# Patient Record
Sex: FEMALE | Race: WHITE | NOT HISPANIC OR LATINO
[De-identification: names, ages, dates, MRNs, and addresses within clinical notes are randomized per-mention and may not be internally consistent; named-entity substitution may affect disease eponyms.]

---

## 2018-01-02 ENCOUNTER — NON-APPOINTMENT (OUTPATIENT)
Age: 58
End: 2018-01-02

## 2018-01-02 ENCOUNTER — APPOINTMENT (OUTPATIENT)
Dept: CARDIOLOGY | Facility: CLINIC | Age: 58
End: 2018-01-02
Payer: COMMERCIAL

## 2018-01-02 VITALS
DIASTOLIC BLOOD PRESSURE: 102 MMHG | SYSTOLIC BLOOD PRESSURE: 177 MMHG | HEIGHT: 61 IN | OXYGEN SATURATION: 94 % | BODY MASS INDEX: 30.58 KG/M2 | HEART RATE: 67 BPM | WEIGHT: 162 LBS

## 2018-01-02 VITALS — DIASTOLIC BLOOD PRESSURE: 90 MMHG | SYSTOLIC BLOOD PRESSURE: 158 MMHG

## 2018-01-02 DIAGNOSIS — Z87.891 PERSONAL HISTORY OF NICOTINE DEPENDENCE: ICD-10-CM

## 2018-01-02 DIAGNOSIS — I21.11 ST ELEVATION (STEMI) MYOCARDIAL INFARCTION INVOLVING RIGHT CORONARY ARTERY: ICD-10-CM

## 2018-01-02 DIAGNOSIS — Z82.49 FAMILY HISTORY OF ISCHEMIC HEART DISEASE AND OTHER DISEASES OF THE CIRCULATORY SYSTEM: ICD-10-CM

## 2018-01-02 PROCEDURE — 99204 OFFICE O/P NEW MOD 45 MIN: CPT

## 2018-01-02 PROCEDURE — 93000 ELECTROCARDIOGRAM COMPLETE: CPT

## 2018-01-02 RX ORDER — ASPIRIN 81 MG
81 TABLET, DELAYED RELEASE (ENTERIC COATED) ORAL DAILY
Qty: 90 | Refills: 3 | Status: ACTIVE | COMMUNITY

## 2018-01-30 ENCOUNTER — APPOINTMENT (OUTPATIENT)
Dept: CARDIOLOGY | Facility: CLINIC | Age: 58
End: 2018-01-30
Payer: COMMERCIAL

## 2018-01-30 VITALS
SYSTOLIC BLOOD PRESSURE: 183 MMHG | OXYGEN SATURATION: 98 % | WEIGHT: 161 LBS | BODY MASS INDEX: 30.4 KG/M2 | HEIGHT: 61 IN | DIASTOLIC BLOOD PRESSURE: 98 MMHG | HEART RATE: 65 BPM

## 2018-01-30 VITALS — DIASTOLIC BLOOD PRESSURE: 88 MMHG | SYSTOLIC BLOOD PRESSURE: 150 MMHG

## 2018-01-30 PROCEDURE — 99215 OFFICE O/P EST HI 40 MIN: CPT

## 2018-01-31 ENCOUNTER — OTHER (OUTPATIENT)
Age: 58
End: 2018-01-31

## 2018-01-31 LAB
ALBUMIN SERPL ELPH-MCNC: 4 G/DL
ALP BLD-CCNC: 172 U/L
ALT SERPL-CCNC: 60 U/L
ANION GAP SERPL CALC-SCNC: 13 MMOL/L
AST SERPL-CCNC: 42 U/L
BASOPHILS # BLD AUTO: 0.04 K/UL
BASOPHILS NFR BLD AUTO: 0.5 %
BILIRUB SERPL-MCNC: 0.7 MG/DL
BUN SERPL-MCNC: 13 MG/DL
CALCIUM SERPL-MCNC: 10 MG/DL
CHLORIDE SERPL-SCNC: 106 MMOL/L
CHOLEST SERPL-MCNC: 170 MG/DL
CHOLEST/HDLC SERPL: 3.5 RATIO
CO2 SERPL-SCNC: 24 MMOL/L
CREAT SERPL-MCNC: 0.91 MG/DL
EOSINOPHIL # BLD AUTO: 0.33 K/UL
EOSINOPHIL NFR BLD AUTO: 4.4 %
GLUCOSE SERPL-MCNC: 96 MG/DL
HCT VFR BLD CALC: 45.4 %
HDLC SERPL-MCNC: 48 MG/DL
HGB BLD-MCNC: 14.2 G/DL
IMM GRANULOCYTES NFR BLD AUTO: 0.1 %
LDLC SERPL CALC-MCNC: 95 MG/DL
LYMPHOCYTES # BLD AUTO: 2.28 K/UL
LYMPHOCYTES NFR BLD AUTO: 30.4 %
MAN DIFF?: NORMAL
MCHC RBC-ENTMCNC: 28.2 PG
MCHC RBC-ENTMCNC: 31.3 GM/DL
MCV RBC AUTO: 90.3 FL
MONOCYTES # BLD AUTO: 0.35 K/UL
MONOCYTES NFR BLD AUTO: 4.7 %
NEUTROPHILS # BLD AUTO: 4.48 K/UL
NEUTROPHILS NFR BLD AUTO: 59.9 %
PLATELET # BLD AUTO: 338 K/UL
POTASSIUM SERPL-SCNC: 5.3 MMOL/L
PROT SERPL-MCNC: 7.2 G/DL
RBC # BLD: 5.03 M/UL
RBC # FLD: 15.3 %
SODIUM SERPL-SCNC: 143 MMOL/L
TRIGL SERPL-MCNC: 136 MG/DL
WBC # FLD AUTO: 7.49 K/UL

## 2018-01-31 RX ORDER — ATORVASTATIN CALCIUM 80 MG/1
80 TABLET, FILM COATED ORAL
Qty: 90 | Refills: 3 | Status: DISCONTINUED | COMMUNITY
End: 2018-01-31

## 2018-02-26 ENCOUNTER — APPOINTMENT (OUTPATIENT)
Dept: CARDIOLOGY | Facility: CLINIC | Age: 58
End: 2018-02-26
Payer: COMMERCIAL

## 2018-02-26 ENCOUNTER — NON-APPOINTMENT (OUTPATIENT)
Age: 58
End: 2018-02-26

## 2018-02-26 VITALS
BODY MASS INDEX: 29.45 KG/M2 | WEIGHT: 156 LBS | HEIGHT: 61 IN | OXYGEN SATURATION: 93 % | HEART RATE: 71 BPM | SYSTOLIC BLOOD PRESSURE: 154 MMHG | DIASTOLIC BLOOD PRESSURE: 95 MMHG

## 2018-02-26 VITALS — SYSTOLIC BLOOD PRESSURE: 130 MMHG | DIASTOLIC BLOOD PRESSURE: 80 MMHG

## 2018-02-26 PROCEDURE — 99215 OFFICE O/P EST HI 40 MIN: CPT

## 2018-02-26 PROCEDURE — 93000 ELECTROCARDIOGRAM COMPLETE: CPT

## 2018-02-26 RX ORDER — LISINOPRIL 20 MG/1
20 TABLET ORAL
Qty: 90 | Refills: 3 | Status: DISCONTINUED | COMMUNITY
End: 2018-02-26

## 2018-03-26 ENCOUNTER — APPOINTMENT (OUTPATIENT)
Dept: CARDIOLOGY | Facility: CLINIC | Age: 58
End: 2018-03-26
Payer: COMMERCIAL

## 2018-03-26 VITALS
BODY MASS INDEX: 29.27 KG/M2 | HEIGHT: 61 IN | HEART RATE: 68 BPM | SYSTOLIC BLOOD PRESSURE: 177 MMHG | OXYGEN SATURATION: 98 % | DIASTOLIC BLOOD PRESSURE: 101 MMHG | WEIGHT: 155 LBS

## 2018-03-26 VITALS — DIASTOLIC BLOOD PRESSURE: 94 MMHG | SYSTOLIC BLOOD PRESSURE: 158 MMHG

## 2018-03-26 LAB
ALBUMIN SERPL ELPH-MCNC: 4.2 G/DL
ALP BLD-CCNC: 87 U/L
ALT SERPL-CCNC: 74 U/L
ANION GAP SERPL CALC-SCNC: 13 MMOL/L
AST SERPL-CCNC: 45 U/L
BILIRUB SERPL-MCNC: 0.7 MG/DL
BUN SERPL-MCNC: 19 MG/DL
CALCIUM SERPL-MCNC: 9.6 MG/DL
CHLORIDE SERPL-SCNC: 105 MMOL/L
CHOLEST SERPL-MCNC: 142 MG/DL
CHOLEST/HDLC SERPL: 2.6 RATIO
CO2 SERPL-SCNC: 23 MMOL/L
CREAT SERPL-MCNC: 1.22 MG/DL
GLUCOSE SERPL-MCNC: 95 MG/DL
HDLC SERPL-MCNC: 54 MG/DL
LDLC SERPL CALC-MCNC: 65 MG/DL
POTASSIUM SERPL-SCNC: 4.5 MMOL/L
PROT SERPL-MCNC: 7.1 G/DL
SODIUM SERPL-SCNC: 141 MMOL/L
TRIGL SERPL-MCNC: 116 MG/DL

## 2018-03-26 PROCEDURE — 99215 OFFICE O/P EST HI 40 MIN: CPT

## 2018-03-26 RX ORDER — ROSUVASTATIN CALCIUM 40 MG/1
40 TABLET, FILM COATED ORAL DAILY
Qty: 90 | Refills: 3 | Status: DISCONTINUED | COMMUNITY
Start: 2018-01-31 | End: 2018-03-26

## 2018-03-26 RX ORDER — VALSARTAN 80 MG/1
80 TABLET, COATED ORAL
Qty: 90 | Refills: 3 | Status: DISCONTINUED | COMMUNITY
Start: 2018-02-26 | End: 2018-03-26

## 2018-04-02 ENCOUNTER — TRANSCRIPTION ENCOUNTER (OUTPATIENT)
Age: 58
End: 2018-04-02

## 2018-05-07 ENCOUNTER — APPOINTMENT (OUTPATIENT)
Dept: CARDIOLOGY | Facility: CLINIC | Age: 58
End: 2018-05-07
Payer: COMMERCIAL

## 2018-05-07 VITALS — SYSTOLIC BLOOD PRESSURE: 110 MMHG | DIASTOLIC BLOOD PRESSURE: 76 MMHG

## 2018-05-07 VITALS
SYSTOLIC BLOOD PRESSURE: 136 MMHG | HEART RATE: 65 BPM | DIASTOLIC BLOOD PRESSURE: 84 MMHG | HEIGHT: 61 IN | BODY MASS INDEX: 28.7 KG/M2 | WEIGHT: 152 LBS

## 2018-05-07 PROCEDURE — 99215 OFFICE O/P EST HI 40 MIN: CPT

## 2018-05-08 LAB
ALBUMIN SERPL ELPH-MCNC: 4.1 G/DL
ALP BLD-CCNC: 103 U/L
ALT SERPL-CCNC: 41 U/L
ANION GAP SERPL CALC-SCNC: 11 MMOL/L
AST SERPL-CCNC: 36 U/L
BILIRUB SERPL-MCNC: 0.7 MG/DL
BUN SERPL-MCNC: 18 MG/DL
CALCIUM SERPL-MCNC: 9.6 MG/DL
CHLORIDE SERPL-SCNC: 110 MMOL/L
CO2 SERPL-SCNC: 21 MMOL/L
CREAT SERPL-MCNC: 1.32 MG/DL
GLUCOSE SERPL-MCNC: 103 MG/DL
POTASSIUM SERPL-SCNC: 4.4 MMOL/L
PROT SERPL-MCNC: 7.4 G/DL
SODIUM SERPL-SCNC: 142 MMOL/L

## 2018-06-05 ENCOUNTER — APPOINTMENT (OUTPATIENT)
Dept: CARDIOLOGY | Facility: CLINIC | Age: 58
End: 2018-06-05
Payer: COMMERCIAL

## 2018-06-05 PROCEDURE — 93306 TTE W/DOPPLER COMPLETE: CPT

## 2018-07-02 ENCOUNTER — NON-APPOINTMENT (OUTPATIENT)
Age: 58
End: 2018-07-02

## 2018-07-02 ENCOUNTER — APPOINTMENT (OUTPATIENT)
Dept: CARDIOLOGY | Facility: CLINIC | Age: 58
End: 2018-07-02
Payer: COMMERCIAL

## 2018-07-02 VITALS
DIASTOLIC BLOOD PRESSURE: 79 MMHG | SYSTOLIC BLOOD PRESSURE: 125 MMHG | BODY MASS INDEX: 28.13 KG/M2 | HEART RATE: 63 BPM | WEIGHT: 149 LBS | OXYGEN SATURATION: 98 % | HEIGHT: 61 IN

## 2018-07-02 VITALS — SYSTOLIC BLOOD PRESSURE: 110 MMHG | DIASTOLIC BLOOD PRESSURE: 70 MMHG

## 2018-07-02 DIAGNOSIS — R74.8 ABNORMAL LEVELS OF OTHER SERUM ENZYMES: ICD-10-CM

## 2018-07-02 PROCEDURE — 93000 ELECTROCARDIOGRAM COMPLETE: CPT

## 2018-07-02 PROCEDURE — 99215 OFFICE O/P EST HI 40 MIN: CPT

## 2018-07-02 RX ORDER — METOPROLOL TARTRATE 25 MG/1
25 TABLET, FILM COATED ORAL TWICE DAILY
Qty: 180 | Refills: 3 | Status: DISCONTINUED | COMMUNITY
End: 2018-07-02

## 2018-09-18 ENCOUNTER — MEDICATION RENEWAL (OUTPATIENT)
Age: 58
End: 2018-09-18

## 2018-09-18 RX ORDER — OLMESARTAN MEDOXOMIL 5 MG/1
5 TABLET, FILM COATED ORAL DAILY
Qty: 90 | Refills: 3 | Status: DISCONTINUED | COMMUNITY
Start: 2018-03-26 | End: 2018-09-18

## 2018-09-24 ENCOUNTER — APPOINTMENT (OUTPATIENT)
Dept: CARDIOLOGY | Facility: CLINIC | Age: 58
End: 2018-09-24
Payer: COMMERCIAL

## 2018-09-24 ENCOUNTER — NON-APPOINTMENT (OUTPATIENT)
Age: 58
End: 2018-09-24

## 2018-09-24 VITALS
DIASTOLIC BLOOD PRESSURE: 85 MMHG | HEIGHT: 61 IN | SYSTOLIC BLOOD PRESSURE: 142 MMHG | WEIGHT: 150 LBS | HEART RATE: 55 BPM | BODY MASS INDEX: 28.32 KG/M2 | OXYGEN SATURATION: 99 %

## 2018-09-24 VITALS — DIASTOLIC BLOOD PRESSURE: 66 MMHG | SYSTOLIC BLOOD PRESSURE: 128 MMHG

## 2018-09-24 PROCEDURE — 93000 ELECTROCARDIOGRAM COMPLETE: CPT

## 2018-09-24 PROCEDURE — 99215 OFFICE O/P EST HI 40 MIN: CPT

## 2018-12-18 ENCOUNTER — NON-APPOINTMENT (OUTPATIENT)
Age: 58
End: 2018-12-18

## 2018-12-18 ENCOUNTER — APPOINTMENT (OUTPATIENT)
Dept: CARDIOLOGY | Facility: CLINIC | Age: 58
End: 2018-12-18
Payer: COMMERCIAL

## 2018-12-18 VITALS
BODY MASS INDEX: 28.7 KG/M2 | SYSTOLIC BLOOD PRESSURE: 160 MMHG | DIASTOLIC BLOOD PRESSURE: 88 MMHG | OXYGEN SATURATION: 100 % | HEIGHT: 61 IN | HEART RATE: 62 BPM | WEIGHT: 152 LBS

## 2018-12-18 VITALS — SYSTOLIC BLOOD PRESSURE: 128 MMHG | DIASTOLIC BLOOD PRESSURE: 80 MMHG

## 2018-12-18 PROCEDURE — 99215 OFFICE O/P EST HI 40 MIN: CPT

## 2018-12-18 PROCEDURE — 93000 ELECTROCARDIOGRAM COMPLETE: CPT

## 2018-12-18 NOTE — PHYSICAL EXAM
[General Appearance - In No Acute Distress] : no acute distress [Normal Conjunctiva] : the conjunctiva exhibited no abnormalities [No Oral Pallor] : no oral pallor [Respiration, Rhythm And Depth] : normal respiratory rhythm and effort [Auscultation Breath Sounds / Voice Sounds] : lungs were clear to auscultation bilaterally [Bowel Sounds] : normal bowel sounds [Abdomen Tenderness] : non-tender [Abnormal Walk] : normal gait [Cyanosis, Localized] : no localized cyanosis [] : no rash [Oriented To Time, Place, And Person] : oriented to person, place, and time [Not Palpable] : not palpable [No Precordial Heave] : no precordial heave was noted [Bradycardia] : bradycardic [Rhythm Regular] : regular [Normal S1] : normal S1 [Normal S2] : normal S2 [No Gallop] : no gallop heard [No Murmur] : no murmurs heard [2+] : left 2+ [No Abnormalities] : the abdominal aorta was not enlarged and no bruit was heard [No Pitting Edema] : no pitting edema present [FreeTextEntry1] : no JVD is appreciated at a 45° angle [Apical Thrill] : no thrill palpable at the apex [Click] : no click [Pericardial Rub] : no pericardial rub [Right Carotid Bruit] : no bruit heard over the right carotid [Left Carotid Bruit] : no bruit heard over the left carotid

## 2018-12-18 NOTE — HISTORY OF PRESENT ILLNESS
[FreeTextEntry1] : Mrs. Pastora Brock presented to the office today for follow-up cardiac evaluation.\par \par The patient is a 58-year-old female with a history of coronary artery disease (status post ACS 10/12/17, PCI to RCA 10/12/17, PCI to Cx. 10/13/17), hypertension, and a dyslipidemia. I evaluated the patient in initial cardiology consultation on 1/2/18, and I last evaluated the patient in the office on 9/24/18.\par \par The patient has been stable from a cardiac symptomatic standpoint since her previous visit here on 9/24/18. Specifically, she has not experienced recurrence of the left neck sensation or the bilateral upper extremity numbness sensation that she experienced in association with her acute coronary syndrome on 10/12/17. She has not experienced any symptoms of chest discomfort or dyspnea on exertion. She has not noted orthopnea, paroxysmal nocturnal dyspnea, or lower extremity edema. She has not experienced any episodes of palpitations, presyncope or syncope.\par \par Review of systems is significant for the patient having injured her right knee secondary to falling while in [place] in October of 2018. Upon returning home, she had an MRI scan of the knee performed, which she states revealed "a chip fracture", which was managed with a steroid injection, rest, and ambulation with a cane.\par \par Laboratory studies performed on 3/26/18 revealed cholesterol 142, triglycerides 116, HDL 54, and calculated LDL 65. The AST was mildly elevated at 45 and the ALT was elevated at 74. The total bilirubin and alkaline phosphatase levels were normal.  A  follow-up blood test performed on 5/7/18 revealed the liver chemistries to be normal.\par \par Echocardiography performed on 6/5/18 revealed normal cardiac chamber sizes with normal left ventricular wall thickness and wall motion. Left ventricular systolic function was normal, with a calculated ejection fraction of 65%. There was evidence for impaired diastolic relaxation of the left ventricle. Mild mitral regurgitation was demonstrated. Mild to moderate pulmonic regurgitation and mild tricuspid regurgitation were demonstrated, with an estimated right ventricular systolic pressure of 35 mm mercury.\par \par Previous History:\par \par The patient did not have a documented cardiac history until 10/12/17. She awakened from sleep early that morning not feeling well, having been experiencing a vaguely-described sensation in the left neck region and bilateral upper extremity "numbness". She was evaluated in the emergency room at [facility] shortly after the onset of the symptoms, and was noted to be exhibiting electrocardiographic evidence of an acute inferior wall myocardial infarction. She was brought urgently to the cardiac catheterization laboratory. Left ventriculography revealed posterobasilar hypokinesis with an estimated ejection fraction of 55%. The left main coronary artery was noted to have minor luminal irregularities, without any obstructive lesions.  A 20% stenosis is noted involving the proximal portion of the left anterior descending artery, with minor luminal irregularities distally.  A 99% stenosis was noted involving the proximal portion of the circumflex artery as well as a 99% stenosis involving the mid-portion of this vessel, a 20% stenosis involving the first obtuse marginal branch of the vessel, and a 70% stenosis involving a small posterolateral branch. The right coronary artery was occluded in the proximal portion, with the lesion being irregularly contoured, hazy, and associated with a moderate filling defect. This lesion was felt to be the culprit lesion for the acute myocardial infarction, and a successful intervention with a drug-eluting stent was performed at the time of the angiogram. The patient was brought back to the cardiac catheterization laboratory on 10/13/17, at which time interventions with drug-eluting stents were performed on the two 99% lesions involving the proximal and mid-portions of the circumflex artery. The patient had an uncomplicated hospital course following these procedures. She was treated with a beta-blocker, an ACE inhibitor, a statin, aspirin, and Brilinta.\par \par As far as risk factors for coronary artery disease are concerned, the patient has a history of hypertension and a dyslipidemia. She does not have a history of diabetes. She discontinued cigarette smoking approximately 35 years ago, having smoked an average of 3-4 cigarettes per day for a period of 5 years prior to that time. She describes having a family history of premature coronary artery disease in her brother, stating that he underwent a coronary stenting procedure while in his 50's.\par \par Past medical/surgical history according to the patient is otherwise significant for arthroscopic left shoulder rotator cuff repair on 11/16/10, arthroscopic right shoulder rotator cuff repair (secondary to an accident) in 2012, surgery for treatment of a left foot tailor's bunion (including a screw in the fifth toe), surgery for treatment of a tailor's bunion involving the right foot, and a laparoscopic appendectomy for treatment of acute appendicitis. She has had 5 pregnancies, 2 of which spontaneously terminated, and the other 3 of which were full-term and uncomplicated, delivered vaginally.

## 2018-12-18 NOTE — DISCUSSION/SUMMARY
[FreeTextEntry1] : Mrs. Brock is status post an acute inferior wall myocardial infarction on 10/12/17, treated promptly with a successful primary PCI (MIRNA) involving an occlusion with thrombus involving the proximal portion of the right coronary artery. The following day, she underwent successful PCI (MIRNA) of two tight stenoses involving the proximal and mid-portions of the circumflex artery. Left ventriculography at the time of the initial procedure on 1/12/17 revealed posterobasilar hypokinesis with overall preserved left ventricular systolic function (estimated ejection fraction of 55%).  A follow-up echocardiogram performed in our office on 6/5/18 revealed normal left ventricular wall motion and systolic function, with an estimated ejection fraction of 65%. She has been doing well from a cardiac symptomatic standpoint since her previous visit here on 9/24/18 (and since undergoing the coronary stenting procedures). Specifically, she does not describe having experienced any signs or symptoms to suggest the presence of an anginal syndrome, congestive heart failure, or a hemodynamically-compromising arrhythmia. Her cardiac examination today is unremarkable. Her blood pressure reading was elevated upon initial presentation to the office today, however, a follow-up measurement obtained after her examination revealed the reading to be normal pregnancies she reports her home blood pressure readings as having been running in the normal range since her previous visit here as well). Her electrocardiogram  today reveals sinus rhythm at a rate of 64 beats per minute with non-specific diminished voltage in leads V4-V6, essentially unchanged from her previous office tracing, allowing for lead placement variation.\par \par As her blood pressure reading today is normal, I have instructed the patient to continue Losartan at a dosage of 25 mg daily and Toprol-XL at a dosage of 50 mg daily for the time being.\par \par The patient had follow-up blood testing performed in our office this morning, including a fasting lipid profile and chemistry screen. I will telephone her to discuss the findings, once they are available. I have instructed her to continue Crestor at a dosage of 40 mg daily for the time being.\par \par I have discussed the findings of the echocardiogram of 6/5/18 in detail with the patient today.  A follow-up study will be planned for June of 2019 for reassessment.\par \par I have again discussed the implications of the acute inferior wall  myocardial infarction and the findings at the coronary angiogram in detail with the patient and her  today. For the time being, I have recommended to her that she continue on the present dosages aspirin and Brilinta.\par \par The importance of dietary modification, weight loss, and regular exercise as tolerated was again discussed with the patient today. I have outlined suggestions for her activity level at this point, as well as restrictions.\par \par I have asked the patient to call me if you have any questions or problems pertaining to these matters, and especially if she should experience any concerning symptoms. I have otherwise asked her to return to the office for follow-up cardiac evaluation and blood pressure reassessment in 6 months, provided she remains clinically stable in the interim. Nuclear stress testing will be planned for October of 2019 to assess the status of the patient's ischemic heart disease, provided she remains clinically stable in the interim.

## 2018-12-19 LAB
ALBUMIN SERPL ELPH-MCNC: 4.1 G/DL
ALP BLD-CCNC: 122 U/L
ALT SERPL-CCNC: 184 U/L
ANION GAP SERPL CALC-SCNC: 11 MMOL/L
AST SERPL-CCNC: 141 U/L
BILIRUB SERPL-MCNC: 0.7 MG/DL
BUN SERPL-MCNC: 23 MG/DL
CALCIUM SERPL-MCNC: 9.1 MG/DL
CHLORIDE SERPL-SCNC: 108 MMOL/L
CHOLEST SERPL-MCNC: 152 MG/DL
CHOLEST/HDLC SERPL: 2.4 RATIO
CO2 SERPL-SCNC: 22 MMOL/L
CREAT SERPL-MCNC: 1.14 MG/DL
GLUCOSE SERPL-MCNC: 98 MG/DL
HDLC SERPL-MCNC: 64 MG/DL
LDLC SERPL CALC-MCNC: 64 MG/DL
POTASSIUM SERPL-SCNC: 4.1 MMOL/L
PROT SERPL-MCNC: 6.7 G/DL
SODIUM SERPL-SCNC: 141 MMOL/L
TRIGL SERPL-MCNC: 121 MG/DL

## 2019-02-27 ENCOUNTER — TRANSCRIPTION ENCOUNTER (OUTPATIENT)
Age: 59
End: 2019-02-27

## 2019-03-18 ENCOUNTER — APPOINTMENT (OUTPATIENT)
Dept: CARDIOLOGY | Facility: CLINIC | Age: 59
End: 2019-03-18
Payer: COMMERCIAL

## 2019-03-18 ENCOUNTER — NON-APPOINTMENT (OUTPATIENT)
Age: 59
End: 2019-03-18

## 2019-03-18 VITALS
WEIGHT: 152 LBS | BODY MASS INDEX: 28.7 KG/M2 | SYSTOLIC BLOOD PRESSURE: 171 MMHG | HEART RATE: 68 BPM | OXYGEN SATURATION: 97 % | HEIGHT: 61 IN | DIASTOLIC BLOOD PRESSURE: 109 MMHG

## 2019-03-18 VITALS — SYSTOLIC BLOOD PRESSURE: 148 MMHG | DIASTOLIC BLOOD PRESSURE: 90 MMHG

## 2019-03-18 PROCEDURE — 99215 OFFICE O/P EST HI 40 MIN: CPT

## 2019-03-18 PROCEDURE — 93000 ELECTROCARDIOGRAM COMPLETE: CPT

## 2019-03-18 NOTE — DISCUSSION/SUMMARY
[FreeTextEntry1] : Mrs. Brock is status post an acute inferior wall myocardial infarction on 10/12/17, treated promptly with a successful primary PCI (MIRNA) involving an occlusion with thrombus involving the proximal portion of the right coronary artery. The following day, she underwent successful PCI (MIRNA) of two tight stenoses involving the proximal and mid-portions of the circumflex artery. Left ventriculography at the time of the initial procedure on 1/12/17 revealed posterobasilar hypokinesis with overall preserved left ventricular systolic function (estimated ejection fraction of 55%).  A follow-up echocardiogram performed in our office on 6/5/18 revealed normal left ventricular wall motion and systolic function, with an estimated ejection fraction of 65%. She has been doing well from a cardiac symptomatic standpoint since her previous visit here on 12/18/18 (and since undergoing the coronary stenting procedures). Specifically, she does not describe having experienced any signs or symptoms to suggest the presence of an anginal syndrome, congestive heart failure, or a hemodynamically-compromising arrhythmia. Her cardiac examination today is unremarkable. Her blood pressure reading is elevated today. Her electrocardiogram  today reveals sinus rhythm at a rate of 64 beats per minute with a non-specific RSR' pattern in lead V2 and non-specific diminished voltage in leads V4-V6, essentially unchanged from her previous office tracing, allowing for lead placement variation.\par \par As her blood pressure reading today is elevated, as well as her home blood pressure readings having been mildly elevated since her previous visit here, I have instructed the patient to increase the dosage of losartan to 25 mg twice daily. In addition, as per the patient's preference, the dosage of Toprol-XL will be changed from 50 mg once daily to 25 mg twice daily. I have electronically prescribed both of these medications to the patient's pharmacy for her today, and I have asked her to call me if she has any difficulty tolerating these changes in her medication regimen.\par \par I have reviewed the findings of the blood test report of 12/18/18 in detail with the patient today, pointing out to her that her liver chemistries were elevated. Will follow-up blood testing will be performed in the office this morning, and if the patient's liver chemistries are still elevated, she will be instructed to reduce the dosage of Crestor from 40 mg daily to 20 mg daily, with repeat blood testing to be performed in one month for reassessment.\par \par I have discussed the findings of the echocardiogram of 6/5/18 in detail with the patient today.  A follow-up study will be planned for June of 2019 for reassessment.\par \par I have again discussed the implications of the acute inferior wall  myocardial infarction and the findings at the coronary angiogram in detail with the patient and her  today. For the time being, I have recommended to her that she continue on the present dosages aspirin and Brilinta.\par \par The importance of dietary modification, weight loss, and regular exercise as tolerated was again discussed with the patient today.\par \par I have asked the patient to call me if you have any questions or problems pertaining to these matters, and especially if she should experience any concerning symptoms. I have otherwise asked her to return to the office for follow-up cardiac evaluation and blood pressure reassessment in one month, provided she remains clinically stable in the interim. Nuclear stress testing will be planned for October of 2019 to assess the status of the patient's ischemic heart disease, provided she remains clinically stable in the interim.

## 2019-03-18 NOTE — PHYSICAL EXAM
[General Appearance - In No Acute Distress] : no acute distress [Normal Conjunctiva] : the conjunctiva exhibited no abnormalities [No Oral Pallor] : no oral pallor [Respiration, Rhythm And Depth] : normal respiratory rhythm and effort [Auscultation Breath Sounds / Voice Sounds] : lungs were clear to auscultation bilaterally [Bowel Sounds] : normal bowel sounds [Abdomen Tenderness] : non-tender [Abnormal Walk] : normal gait [Cyanosis, Localized] : no localized cyanosis [] : no rash [Oriented To Time, Place, And Person] : oriented to person, place, and time [No Precordial Heave] : no precordial heave was noted [Not Palpable] : not palpable [Normal Rate] : normal [Normal S1] : normal S1 [Rhythm Regular] : regular [No Gallop] : no gallop heard [Normal S2] : normal S2 [No Murmur] : no murmurs heard [2+] : right 2+ [No Abnormalities] : the abdominal aorta was not enlarged and no bruit was heard [No Pitting Edema] : no pitting edema present [FreeTextEntry1] : no JVD is appreciated at a 45° angle [Apical Thrill] : no thrill palpable at the apex [Click] : no click [Pericardial Rub] : no pericardial rub [Right Carotid Bruit] : no bruit heard over the right carotid [Left Carotid Bruit] : no bruit heard over the left carotid

## 2019-03-18 NOTE — HISTORY OF PRESENT ILLNESS
[FreeTextEntry1] : Mrs. Pastora Brock presented to the office today for follow-up cardiac evaluation.\par \par The patient is a 59-year-old female with a history of coronary artery disease (status post ACS 10/12/17, PCI to RCA 10/12/17, PCI to Cx. 10/13/17), hypertension, and a dyslipidemia. I evaluated the patient in initial cardiology consultation on 1/2/18, and I last evaluated the patient in the office on 12/18/18.\par \par The patient has been stable from a cardiac symptomatic standpoint since her previous visit here on 12/18/18. Specifically, she has not experienced recurrence of the left neck sensation or the bilateral upper extremity numbness sensation that she experienced in association with her acute coronary syndrome on 10/12/17. She has not experienced any symptoms of chest discomfort or dyspnea on exertion. She has not noted orthopnea, paroxysmal nocturnal dyspnea, or lower extremity edema. She has not experienced any episodes of palpitations, presyncope or syncope.\par \par The patient has been monitoring her blood pressure readings at home since her previous visit here, and reports readings having been predominantly running in the 140's/70's range. She has requested that her metoprolol dosing be changed back to twice daily, stating that she felt that her blood pressure readings were better when she split the dosage of the metoprolol.\par \par Review of systems is significant for the patient having injured her right knee secondary to falling while in [place] in October of 2018. Upon returning home, she had an MRI scan of the knee performed, which she states revealed "a chip fracture", which was managed with a steroid injection, rest, and ambulation with a cane.\par \par Laboratory studies performed on 12/18/18 revealed cholesterol 152, triglycerides 121, HDL 64, and calculated LDL 64. The liver chemistries were elevated (, , alkaline phosphatase 122). I had left a message on the patient's answering machine regarding these results, and recommended that she reduce the dosage of Crestor from 40 mg daily to 20 mg daily, however, she states that she had difficulty splitting the pill in half, and elected to continue Crestor at a dosage of 40 mg daily.\par \par Echocardiography performed on 6/5/18 revealed normal cardiac chamber sizes with normal left ventricular wall thickness and wall motion. Left ventricular systolic function was normal, with a calculated ejection fraction of 65%. There was evidence for impaired diastolic relaxation of the left ventricle. Mild mitral regurgitation was demonstrated. Mild to moderate pulmonic regurgitation and mild tricuspid regurgitation were demonstrated, with an estimated right ventricular systolic pressure of 35 mm mercury.\par \par Previous History:\par \par The patient did not have a documented cardiac history until 10/12/17. She awakened from sleep early that morning not feeling well, having been experiencing a vaguely-described sensation in the left neck region and bilateral upper extremity "numbness". She was evaluated in the emergency room at [facility] shortly after the onset of the symptoms, and was noted to be exhibiting electrocardiographic evidence of an acute inferior wall myocardial infarction. She was brought urgently to the cardiac catheterization laboratory. Left ventriculography revealed posterobasilar hypokinesis with an estimated ejection fraction of 55%. The left main coronary artery was noted to have minor luminal irregularities, without any obstructive lesions.  A 20% stenosis is noted involving the proximal portion of the left anterior descending artery, with minor luminal irregularities distally.  A 99% stenosis was noted involving the proximal portion of the circumflex artery as well as a 99% stenosis involving the mid-portion of this vessel, a 20% stenosis involving the first obtuse marginal branch of the vessel, and a 70% stenosis involving a small posterolateral branch. The right coronary artery was occluded in the proximal portion, with the lesion being irregularly contoured, hazy, and associated with a moderate filling defect. This lesion was felt to be the culprit lesion for the acute myocardial infarction, and a successful intervention with a drug-eluting stent was performed at the time of the angiogram. The patient was brought back to the cardiac catheterization laboratory on 10/13/17, at which time interventions with drug-eluting stents were performed on the two 99% lesions involving the proximal and mid-portions of the circumflex artery. The patient had an uncomplicated hospital course following these procedures. She was treated with a beta-blocker, an ACE inhibitor, a statin, aspirin, and Brilinta.\par \par As far as risk factors for coronary artery disease are concerned, the patient has a history of hypertension and a dyslipidemia. She does not have a history of diabetes. She discontinued cigarette smoking approximately 35 years ago, having smoked an average of 3-4 cigarettes per day for a period of 5 years prior to that time. She describes having a family history of premature coronary artery disease in her brother, stating that he underwent a coronary stenting procedure while in his 50's.\par \par Past medical/surgical history according to the patient is otherwise significant for arthroscopic left shoulder rotator cuff repair on 11/16/10, arthroscopic right shoulder rotator cuff repair (secondary to an accident) in 2012, surgery for treatment of a left foot tailor's bunion (including a screw in the fifth toe), surgery for treatment of a tailor's bunion involving the right foot, and a laparoscopic appendectomy for treatment of acute appendicitis. She has had 5 pregnancies, 2 of which spontaneously terminated, and the other 3 of which were full-term and uncomplicated, delivered vaginally.

## 2019-03-19 ENCOUNTER — MEDICATION RENEWAL (OUTPATIENT)
Age: 59
End: 2019-03-19

## 2019-03-19 LAB
ALBUMIN SERPL ELPH-MCNC: 4.2 G/DL
ALP BLD-CCNC: 88 U/L
ALT SERPL-CCNC: 38 U/L
ANION GAP SERPL CALC-SCNC: 13 MMOL/L
AST SERPL-CCNC: 47 U/L
BILIRUB SERPL-MCNC: 0.7 MG/DL
BUN SERPL-MCNC: 19 MG/DL
CALCIUM SERPL-MCNC: 9.3 MG/DL
CHLORIDE SERPL-SCNC: 106 MMOL/L
CHOLEST SERPL-MCNC: 118 MG/DL
CHOLEST/HDLC SERPL: 3.1 RATIO
CO2 SERPL-SCNC: 23 MMOL/L
CREAT SERPL-MCNC: 1.17 MG/DL
GLUCOSE SERPL-MCNC: 90 MG/DL
HDLC SERPL-MCNC: 38 MG/DL
LDLC SERPL CALC-MCNC: 58 MG/DL
POTASSIUM SERPL-SCNC: 3.6 MMOL/L
PROT SERPL-MCNC: 7 G/DL
SODIUM SERPL-SCNC: 142 MMOL/L
TRIGL SERPL-MCNC: 108 MG/DL

## 2019-06-08 ENCOUNTER — RX RENEWAL (OUTPATIENT)
Age: 59
End: 2019-06-08

## 2019-06-24 ENCOUNTER — MEDICATION RENEWAL (OUTPATIENT)
Age: 59
End: 2019-06-24

## 2019-08-21 ENCOUNTER — APPOINTMENT (OUTPATIENT)
Dept: CARDIOLOGY | Facility: CLINIC | Age: 59
End: 2019-08-21
Payer: COMMERCIAL

## 2019-08-21 ENCOUNTER — NON-APPOINTMENT (OUTPATIENT)
Age: 59
End: 2019-08-21

## 2019-08-21 VITALS
HEART RATE: 64 BPM | SYSTOLIC BLOOD PRESSURE: 179 MMHG | DIASTOLIC BLOOD PRESSURE: 98 MMHG | OXYGEN SATURATION: 98 % | HEIGHT: 61 IN

## 2019-08-21 DIAGNOSIS — R03.0 ELEVATED BLOOD-PRESSURE READING, W/OUT DIAGNOSIS OF HYPERTENSION: ICD-10-CM

## 2019-08-21 LAB
ALBUMIN SERPL ELPH-MCNC: 4.5 G/DL
ALP BLD-CCNC: 111 U/L
ALT SERPL-CCNC: 25 U/L
ANION GAP SERPL CALC-SCNC: 14 MMOL/L
AST SERPL-CCNC: 23 U/L
BILIRUB SERPL-MCNC: 0.9 MG/DL
BUN SERPL-MCNC: 19 MG/DL
CALCIUM SERPL-MCNC: 9.2 MG/DL
CHLORIDE SERPL-SCNC: 112 MMOL/L
CHOLEST SERPL-MCNC: 160 MG/DL
CHOLEST/HDLC SERPL: 2.3 RATIO
CO2 SERPL-SCNC: 21 MMOL/L
CREAT SERPL-MCNC: 1.26 MG/DL
GLUCOSE SERPL-MCNC: 105 MG/DL
HDLC SERPL-MCNC: 69 MG/DL
LDLC SERPL CALC-MCNC: 63 MG/DL
POTASSIUM SERPL-SCNC: 4.3 MMOL/L
PROT SERPL-MCNC: 6.8 G/DL
SODIUM SERPL-SCNC: 147 MMOL/L
TRIGL SERPL-MCNC: 139 MG/DL

## 2019-08-21 PROCEDURE — 99215 OFFICE O/P EST HI 40 MIN: CPT

## 2019-08-21 PROCEDURE — 93000 ELECTROCARDIOGRAM COMPLETE: CPT

## 2019-08-21 NOTE — HISTORY OF PRESENT ILLNESS
[FreeTextEntry1] : Mrs. Pastora Brock presented to the office today for follow-up cardiac evaluation.\par \par The patient is a 59-year-old female with a history of coronary artery disease (status post ACS 10/12/17, PCI to RCA 10/12/17, PCI to Cx. 10/13/17), hypertension, and a dyslipidemia. I evaluated the patient in initial cardiology consultation on 1/2/18, and I last evaluated the patient in the office on 3/18/19.\par \par The patient has been stable from a cardiac symptomatic standpoint since her previous visit here on 3/18/19. Specifically, she has not experienced recurrence of the left neck sensation or the bilateral upper extremity numbness sensation that she experienced in association with her acute coronary syndrome on 10/12/17. She has not experienced any symptoms of chest discomfort or dyspnea on exertion. She has not noted orthopnea, paroxysmal nocturnal dyspnea, or lower extremity edema. She has not experienced any episodes of palpitations, presyncope or syncope.\par \par The patient has been monitoring her blood pressure readings at home since her previous visit here, and reports readings having been predominantly running in the 130's/70's range.\par \par Review of systems is significant for the patient having injured her right knee secondary to falling while in [place] in October of 2018. Upon returning home, she had an MRI scan of the knee performed, which she states revealed "a chip fracture", which was managed with a steroid injection, rest, and ambulation with a cane.\par \par Laboratory studies performed on 3/18/19 revealed cholesterol 118, triglycerides 108, HDL 38, and calculated LDL 50. The AST was mildly elevated at 47. The remainder of the liver chemistries were normal. The BUN and creatinine were 19 and 1.17, respectively. The potassium level was 3.6. The glucose level was 90.\par \par Echocardiography performed on 6/5/18 revealed normal cardiac chamber sizes with normal left ventricular wall thickness and wall motion. Left ventricular systolic function was normal, with a calculated ejection fraction of 65%. There was evidence for impaired diastolic relaxation of the left ventricle. Mild mitral regurgitation was demonstrated. Mild to moderate pulmonic regurgitation and mild tricuspid regurgitation were demonstrated, with an estimated right ventricular systolic pressure of 35 mm mercury.\par \par Previous History:\par \par The patient did not have a documented cardiac history until 10/12/17. She awakened from sleep early that morning not feeling well, having been experiencing a vaguely-described sensation in the left neck region and bilateral upper extremity "numbness". She was evaluated in the emergency room at [facility] shortly after the onset of the symptoms, and was noted to be exhibiting electrocardiographic evidence of an acute inferior wall myocardial infarction. She was brought urgently to the cardiac catheterization laboratory. Left ventriculography revealed posterobasilar hypokinesis with an estimated ejection fraction of 55%. The left main coronary artery was noted to have minor luminal irregularities, without any obstructive lesions.  A 20% stenosis is noted involving the proximal portion of the left anterior descending artery, with minor luminal irregularities distally.  A 99% stenosis was noted involving the proximal portion of the circumflex artery as well as a 99% stenosis involving the mid-portion of this vessel, a 20% stenosis involving the first obtuse marginal branch of the vessel, and a 70% stenosis involving a small posterolateral branch. The right coronary artery was occluded in the proximal portion, with the lesion being irregularly contoured, hazy, and associated with a moderate filling defect. This lesion was felt to be the culprit lesion for the acute myocardial infarction, and a successful intervention with a drug-eluting stent was performed at the time of the angiogram. The patient was brought back to the cardiac catheterization laboratory on 10/13/17, at which time interventions with drug-eluting stents were performed on the two 99% lesions involving the proximal and mid-portions of the circumflex artery. The patient had an uncomplicated hospital course following these procedures. She was treated with a beta-blocker, an ACE inhibitor, a statin, aspirin, and Brilinta.\par \par As far as risk factors for coronary artery disease are concerned, the patient has a history of hypertension and a dyslipidemia. She does not have a history of diabetes. She discontinued cigarette smoking approximately 35 years ago, having smoked an average of 3-4 cigarettes per day for a period of 5 years prior to that time. She describes having a family history of premature coronary artery disease in her brother, stating that he underwent a coronary stenting procedure while in his 50's.\par \par Past medical/surgical history according to the patient is otherwise significant for arthroscopic left shoulder rotator cuff repair on 11/16/10, arthroscopic right shoulder rotator cuff repair (secondary to an accident) in 2012, surgery for treatment of a left foot tailor's bunion (including a screw in the fifth toe), surgery for treatment of a tailor's bunion involving the right foot, and a laparoscopic appendectomy for treatment of acute appendicitis. She has had 5 pregnancies, 2 of which spontaneously terminated, and the other 3 of which were full-term and uncomplicated, delivered vaginally.

## 2019-08-21 NOTE — PHYSICAL EXAM
[General Appearance - In No Acute Distress] : no acute distress [Normal Conjunctiva] : the conjunctiva exhibited no abnormalities [No Oral Pallor] : no oral pallor [Respiration, Rhythm And Depth] : normal respiratory rhythm and effort [Auscultation Breath Sounds / Voice Sounds] : lungs were clear to auscultation bilaterally [Bowel Sounds] : normal bowel sounds [Abdomen Tenderness] : non-tender [Abnormal Walk] : normal gait [Cyanosis, Localized] : no localized cyanosis [] : no rash [Oriented To Time, Place, And Person] : oriented to person, place, and time [Not Palpable] : not palpable [No Precordial Heave] : no precordial heave was noted [Normal Rate] : normal [Rhythm Regular] : regular [Normal S1] : normal S1 [Normal S2] : normal S2 [No Gallop] : no gallop heard [No Murmur] : no murmurs heard [2+] : left 2+ [No Abnormalities] : the abdominal aorta was not enlarged and no bruit was heard [No Pitting Edema] : no pitting edema present [FreeTextEntry1] : no JVD is appreciated at a 45° angle [Click] : no click [Apical Thrill] : no thrill palpable at the apex [Pericardial Rub] : no pericardial rub [Right Carotid Bruit] : no bruit heard over the right carotid [Left Carotid Bruit] : no bruit heard over the left carotid

## 2019-08-21 NOTE — DISCUSSION/SUMMARY
[FreeTextEntry1] : Mrs. Brock is status post an acute inferior wall myocardial infarction on 10/12/17, treated promptly with a successful primary PCI (MIRNA) involving an occlusion with thrombus involving the proximal portion of the right coronary artery. The following day, she underwent successful PCI (MIRNA) of two tight stenoses involving the proximal and mid-portions of the circumflex artery. Left ventriculography at the time of the initial procedure on 1/12/17 revealed posterobasilar hypokinesis with overall preserved left ventricular systolic function (estimated ejection fraction of 55%).  A follow-up echocardiogram performed in our office on 6/5/18 revealed normal left ventricular wall motion and systolic function, with an estimated ejection fraction of 65%. She has been doing well from a cardiac symptomatic standpoint since her previous visit here on 3/18/19 (and since undergoing the coronary stenting procedures). Specifically, she does not describe having experienced any signs or symptoms to suggest the presence of an anginal syndrome, congestive heart failure, or a hemodynamically-compromising arrhythmia. Her cardiac examination today is unremarkable. Her blood pressure reading is markedly elevated today. Her electrocardiogram  today reveals sinus bradycardia at a rate of 59 beats per minute with non-specific diminished voltage in leads V3-V6, essentially unchanged from her previous office tracing, allowing for lead placement variation.\par \par Although the patient's blood pressure reading in the office today is markedly elevated, she has exhibited labile elevations in her blood pressure at office visits previously. She states that her home readings have been reasonably controlled, averaging in the range of 130/70. At the time being, I have instructed the patient to continue her antihypertensive therapy as prescribed. I am referring her for a 24 hour ambulatory blood pressure monitoring study to better assess her blood pressure control. She is going to make arrangements to have this study performed through our office, and I will telephone her to discuss the findings, once the study has been completed.\par \par I am referring the patient for follow-up blood testing this morning, including a fasting lipid profile and chemistry screen.\par \par I have discussed the findings of the echocardiogram of 6/5/18 in detail with the patient today.\par \par I have again discussed the implications of the acute inferior wall  myocardial infarction and the findings at the coronary angiogram in detail with the patient and her  today. For the time being, I have recommended to her that she continue on the present dosages aspirin and Brilinta.\par \par The importance of dietary modification, weight loss, and regular exercise as tolerated was again discussed with the patient today.\par \par I have asked the patient to call me if she should have any questions or problems pertaining to these matters, and especially if she should experience any concerning symptoms. I have otherwise asked her to return to the office for follow-up cardiac evaluation and blood pressure reassessment in one month, provided she remains clinically stable in the interim. Nuclear stress testing will be planned for October of 2019 to assess the status of the patient's ischemic heart disease. Follow-up echocardiography will be arranged at that time as well to reassess left ventricular function, as well as the degree of mitral regurgitation, tricuspid regurgitation, and pulmonic regurgitation.

## 2019-08-26 ENCOUNTER — APPOINTMENT (OUTPATIENT)
Dept: CARDIOLOGY | Facility: CLINIC | Age: 59
End: 2019-08-26

## 2019-10-07 ENCOUNTER — NON-APPOINTMENT (OUTPATIENT)
Age: 59
End: 2019-10-07

## 2019-10-07 ENCOUNTER — APPOINTMENT (OUTPATIENT)
Dept: CARDIOLOGY | Facility: CLINIC | Age: 59
End: 2019-10-07
Payer: COMMERCIAL

## 2019-10-07 VITALS
WEIGHT: 159 LBS | BODY MASS INDEX: 30.02 KG/M2 | DIASTOLIC BLOOD PRESSURE: 88 MMHG | HEART RATE: 60 BPM | HEIGHT: 61 IN | OXYGEN SATURATION: 99 % | SYSTOLIC BLOOD PRESSURE: 180 MMHG

## 2019-10-07 PROCEDURE — 99215 OFFICE O/P EST HI 40 MIN: CPT

## 2019-10-07 PROCEDURE — 93000 ELECTROCARDIOGRAM COMPLETE: CPT

## 2019-10-07 RX ORDER — LOSARTAN POTASSIUM 25 MG/1
25 TABLET, FILM COATED ORAL TWICE DAILY
Qty: 180 | Refills: 3 | Status: DISCONTINUED | COMMUNITY
Start: 2018-09-18 | End: 2019-10-07

## 2019-10-07 NOTE — DISCUSSION/SUMMARY
[FreeTextEntry1] : Mrs. Brock is status post an acute inferior wall myocardial infarction on 10/12/17, treated promptly with a successful primary PCI (MIRNA) involving an occlusion with thrombus involving the proximal portion of the right coronary artery. The following day, she underwent successful PCI (MIRNA) of two tight stenoses involving the proximal and mid-portions of the circumflex artery. Left ventriculography at the time of the initial procedure on 1/12/17 revealed posterobasilar hypokinesis with overall preserved left ventricular systolic function (estimated ejection fraction of 55%).  A follow-up echocardiogram performed in our office on 6/5/18 revealed normal left ventricular wall motion and systolic function, with an estimated ejection fraction of 65%. \par \par The patient has been doing well from a cardiac symptomatic standpoint since her previous visit here on 8/21/19 (and since undergoing the coronary stenting procedures). Specifically, she does not describe having experienced any signs or symptoms to suggest the presence of an anginal syndrome, congestive heart failure, or a hemodynamically-compromising arrhythmia. Her cardiac examination today is unremarkable. Her blood pressure reading is moderately elevated today. Her electrocardiogram  today reveals sinus rhythm at a rate of 60 beats per minute with a non-specific RSR' pattern in leads V1-V2 and non-specific diminished voltage in leads V4-V6, essentially unchanged from her previous office tracing, allowing for lead placement variation.\par \par Although the patient's blood pressure reading in the office today is elevated, she did not take her dosage of losartan is morning, as she was notified by her pharmacy that her supply is on recall. Therefore, I am going to switch her from losartan 25 mg b.i.d. 2 Benicar 20 mg daily. I have electronically prescribed the Benicar to her pharmacy for her today, and I have asked to call me if she has any difficulty tolerating this change in her angiotensin receptor blocker therapy.\par \par I have reviewed the findings of the blood test report of 8/21/19 in detail with the patient today, and I have instructed her to continue Crestor at a dosage of 40 mg daily for the time being.\par \par I have discussed the findings of the echocardiogram of 6/5/18 in detail with the patient today.\par \par I have again discussed the implications of the acute inferior wall myocardial infarction and the findings of the coronary angiogram in detail with the patient and her  today. For the time being, I have recommended to her that she continue on the present dosages aspirin and Brilinta.\par \par The importance of dietary modification, weight loss, and regular exercise as tolerated was again discussed with the patient today.\par \par I have asked the patient to call me if she should have any questions or problems pertaining to these matters, and especially if she should experience any concerning symptoms. I have otherwise asked her to return to the office for follow-up cardiac evaluation and blood pressure reassessment in one month, provided she remains clinically stable in the interim. Nuclear stress testing will be I arranged at that time to assess the status of the patient's ischemic heart disease. In addition, echocardiography will be arranged at that time as well to reassess left ventricular function, as well as the degree of mitral regurgitation, tricuspid regurgitation, and pulmonic regurgitation.

## 2019-10-07 NOTE — HISTORY OF PRESENT ILLNESS
[FreeTextEntry1] : Mrs. Pastora Brock presented to the office today for follow-up cardiac evaluation. I last evaluated the patient in the office on 8/21/19.\par \par The patient is a 59-year-old female with a history of coronary artery disease (status post ACS 10/12/17, PCI to RCA 10/12/17, PCI to Cx. 10/13/17), hypertension, and a dyslipidemia.\par \par The patient has been stable from a cardiac symptomatic standpoint since her previous visit here on 8/21/19. Specifically, she has not experienced recurrence of the left neck sensation or the bilateral upper extremity numbness sensation that she experienced in association with her acute coronary syndrome on 10/12/17. She has not experienced any symptoms of chest discomfort or dyspnea on exertion. She has not noted orthopnea, paroxysmal nocturnal dyspnea, or lower extremity edema. She has not experienced any episodes of palpitations, presyncope or syncope.\par \par The patient informed me today that she was recently notified that her supply of losartan is on recall, and hence, she did not take this medication this morning.\par \par Review of systems is significant for the patient having injured her right knee secondary to falling while in [place] in October of 2018. Upon returning home, she had an MRI scan of the knee performed, which she states revealed "a chip fracture", which was managed with a steroid injection, rest, and ambulation with a cane.\par \par Laboratory studies performed on 8/21/19 revealed cholesterol 160, triglycerides 139, HDL 69, and calculated LDL 63. Liver chemistries were normal. The BUN and creatinine were 19 and 1.26, respectively. The potassium level was 4.3. Glucose level is 105.\par \par Echocardiography performed on 6/5/18 revealed normal cardiac chamber sizes with normal left ventricular wall thickness and wall motion. Left ventricular systolic function was normal, with a calculated ejection fraction of 65%. There was evidence for impaired diastolic relaxation of the left ventricle. Mild mitral regurgitation was demonstrated. Mild to moderate pulmonic regurgitation and mild tricuspid regurgitation were demonstrated, with an estimated right ventricular systolic pressure of 35 mm mercury.\par \par Previous History:\par \par The patient did not have a documented cardiac history until 10/12/17. She awakened from sleep early that morning not feeling well, having been experiencing a vaguely-described sensation in the left neck region and bilateral upper extremity "numbness". She was evaluated in the emergency room at [facility] shortly after the onset of the symptoms, and was noted to be exhibiting electrocardiographic evidence of an acute inferior wall myocardial infarction. She was brought urgently to the cardiac catheterization laboratory. Left ventriculography revealed posterobasilar hypokinesis with an estimated ejection fraction of 55%. The left main coronary artery was noted to have minor luminal irregularities, without any obstructive lesions.  A 20% stenosis is noted involving the proximal portion of the left anterior descending artery, with minor luminal irregularities distally.  A 99% stenosis was noted involving the proximal portion of the circumflex artery as well as a 99% stenosis involving the mid-portion of this vessel, a 20% stenosis involving the first obtuse marginal branch of the vessel, and a 70% stenosis involving a small posterolateral branch. The right coronary artery was occluded in the proximal portion, with the lesion being irregularly contoured, hazy, and associated with a moderate filling defect. This lesion was felt to be the culprit lesion for the acute myocardial infarction, and a successful intervention with a drug-eluting stent was performed at the time of the angiogram. The patient was brought back to the cardiac catheterization laboratory on 10/13/17, at which time interventions with drug-eluting stents were performed on the two 99% lesions involving the proximal and mid-portions of the circumflex artery. The patient had an uncomplicated hospital course following these procedures. She was treated with a beta-blocker, an ACE inhibitor, a statin, aspirin, and Brilinta.\par \par As far as risk factors for coronary artery disease are concerned, the patient has a history of hypertension and a dyslipidemia. She does not have a history of diabetes. She discontinued cigarette smoking approximately 35 years ago, having smoked an average of 3-4 cigarettes per day for a period of 5 years prior to that time. She describes having a family history of premature coronary artery disease in her brother, stating that he underwent a coronary stenting procedure while in his 50's.\par \par Past medical/surgical history according to the patient is otherwise significant for arthroscopic left shoulder rotator cuff repair on 11/16/10, arthroscopic right shoulder rotator cuff repair (secondary to an accident) in 2012, surgery for treatment of a left foot tailor's bunion (including a screw in the fifth toe), surgery for treatment of a tailor's bunion involving the right foot, and a laparoscopic appendectomy for treatment of acute appendicitis. She has had 5 pregnancies, 2 of which spontaneously terminated, and the other 3 of which were full-term and uncomplicated, delivered vaginally.

## 2019-10-17 ENCOUNTER — TRANSCRIPTION ENCOUNTER (OUTPATIENT)
Age: 59
End: 2019-10-17

## 2020-03-24 ENCOUNTER — RX RENEWAL (OUTPATIENT)
Age: 60
End: 2020-03-24

## 2020-05-29 ENCOUNTER — RX RENEWAL (OUTPATIENT)
Age: 60
End: 2020-05-29

## 2020-06-22 ENCOUNTER — RX RENEWAL (OUTPATIENT)
Age: 60
End: 2020-06-22

## 2020-06-29 ENCOUNTER — NON-APPOINTMENT (OUTPATIENT)
Age: 60
End: 2020-06-29

## 2020-06-29 ENCOUNTER — APPOINTMENT (OUTPATIENT)
Dept: CARDIOLOGY | Facility: CLINIC | Age: 60
End: 2020-06-29
Payer: COMMERCIAL

## 2020-06-29 VITALS
OXYGEN SATURATION: 98 % | DIASTOLIC BLOOD PRESSURE: 83 MMHG | HEIGHT: 61 IN | SYSTOLIC BLOOD PRESSURE: 145 MMHG | BODY MASS INDEX: 30.96 KG/M2 | WEIGHT: 164 LBS | HEART RATE: 59 BPM

## 2020-06-29 LAB
ALBUMIN SERPL ELPH-MCNC: 4.6 G/DL
ALP BLD-CCNC: 113 U/L
ALT SERPL-CCNC: 27 U/L
ANION GAP SERPL CALC-SCNC: 11 MMOL/L
AST SERPL-CCNC: 24 U/L
BASOPHILS # BLD AUTO: 0.04 K/UL
BASOPHILS NFR BLD AUTO: 0.7 %
BILIRUB SERPL-MCNC: 0.8 MG/DL
BUN SERPL-MCNC: 23 MG/DL
CALCIUM SERPL-MCNC: 9.6 MG/DL
CHLORIDE SERPL-SCNC: 111 MMOL/L
CHOLEST SERPL-MCNC: 153 MG/DL
CHOLEST/HDLC SERPL: 2.3 RATIO
CO2 SERPL-SCNC: 21 MMOL/L
CREAT SERPL-MCNC: 1.36 MG/DL
EOSINOPHIL # BLD AUTO: 0.2 K/UL
EOSINOPHIL NFR BLD AUTO: 3.5 %
GLUCOSE SERPL-MCNC: 110 MG/DL
HCT VFR BLD CALC: 44.6 %
HDLC SERPL-MCNC: 67 MG/DL
HGB BLD-MCNC: 13.5 G/DL
IMM GRANULOCYTES NFR BLD AUTO: 0.2 %
LDLC SERPL CALC-MCNC: 63 MG/DL
LYMPHOCYTES # BLD AUTO: 1.85 K/UL
LYMPHOCYTES NFR BLD AUTO: 32.3 %
MAN DIFF?: NORMAL
MCHC RBC-ENTMCNC: 26.9 PG
MCHC RBC-ENTMCNC: 30.3 GM/DL
MCV RBC AUTO: 89 FL
MONOCYTES # BLD AUTO: 0.46 K/UL
MONOCYTES NFR BLD AUTO: 8 %
NEUTROPHILS # BLD AUTO: 3.16 K/UL
NEUTROPHILS NFR BLD AUTO: 55.3 %
PLATELET # BLD AUTO: 250 K/UL
POTASSIUM SERPL-SCNC: 4.5 MMOL/L
PROT SERPL-MCNC: 6.7 G/DL
RBC # BLD: 5.01 M/UL
RBC # FLD: 15 %
SODIUM SERPL-SCNC: 144 MMOL/L
TRIGL SERPL-MCNC: 112 MG/DL
WBC # FLD AUTO: 5.72 K/UL

## 2020-06-29 PROCEDURE — 93000 ELECTROCARDIOGRAM COMPLETE: CPT

## 2020-06-29 PROCEDURE — 99215 OFFICE O/P EST HI 40 MIN: CPT

## 2020-07-14 ENCOUNTER — APPOINTMENT (OUTPATIENT)
Dept: CARDIOLOGY | Facility: CLINIC | Age: 60
End: 2020-07-14
Payer: COMMERCIAL

## 2020-07-14 PROCEDURE — 93306 TTE W/DOPPLER COMPLETE: CPT

## 2020-08-03 ENCOUNTER — APPOINTMENT (OUTPATIENT)
Dept: CARDIOLOGY | Facility: CLINIC | Age: 60
End: 2020-08-03
Payer: COMMERCIAL

## 2020-08-03 PROCEDURE — 78452 HT MUSCLE IMAGE SPECT MULT: CPT

## 2020-08-03 PROCEDURE — 93015 CV STRESS TEST SUPVJ I&R: CPT

## 2020-08-03 PROCEDURE — A9500: CPT

## 2020-08-17 RX ORDER — TICAGRELOR 90 MG/1
90 TABLET ORAL
Qty: 180 | Refills: 1 | Status: DISCONTINUED | COMMUNITY
Start: 2020-06-22 | End: 2020-08-17

## 2020-10-05 ENCOUNTER — RX RENEWAL (OUTPATIENT)
Age: 60
End: 2020-10-05

## 2020-12-21 ENCOUNTER — RX RENEWAL (OUTPATIENT)
Age: 60
End: 2020-12-21

## 2021-02-24 ENCOUNTER — NON-APPOINTMENT (OUTPATIENT)
Age: 61
End: 2021-02-24

## 2021-03-23 ENCOUNTER — RX RENEWAL (OUTPATIENT)
Age: 61
End: 2021-03-23

## 2021-04-05 ENCOUNTER — APPOINTMENT (OUTPATIENT)
Dept: CARDIOLOGY | Facility: CLINIC | Age: 61
End: 2021-04-05
Payer: COMMERCIAL

## 2021-04-05 ENCOUNTER — NON-APPOINTMENT (OUTPATIENT)
Age: 61
End: 2021-04-05

## 2021-04-05 VITALS
DIASTOLIC BLOOD PRESSURE: 99 MMHG | OXYGEN SATURATION: 100 % | WEIGHT: 168 LBS | BODY MASS INDEX: 31.72 KG/M2 | HEART RATE: 72 BPM | HEIGHT: 61 IN | SYSTOLIC BLOOD PRESSURE: 152 MMHG

## 2021-04-05 LAB
25(OH)D3 SERPL-MCNC: 40.6 NG/ML
ALBUMIN SERPL ELPH-MCNC: 4.2 G/DL
ALP BLD-CCNC: 78 U/L
ALT SERPL-CCNC: 26 U/L
ANION GAP SERPL CALC-SCNC: 11 MMOL/L
AST SERPL-CCNC: 28 U/L
BASOPHILS # BLD AUTO: 0.04 K/UL
BASOPHILS NFR BLD AUTO: 0.7 %
BILIRUB SERPL-MCNC: 0.8 MG/DL
BUN SERPL-MCNC: 30 MG/DL
CALCIUM SERPL-MCNC: 9.9 MG/DL
CHLORIDE SERPL-SCNC: 108 MMOL/L
CHOLEST SERPL-MCNC: 161 MG/DL
CO2 SERPL-SCNC: 23 MMOL/L
CREAT SERPL-MCNC: 1.44 MG/DL
EOSINOPHIL # BLD AUTO: 0.25 K/UL
EOSINOPHIL NFR BLD AUTO: 4.4 %
ESTIMATED AVERAGE GLUCOSE: 137 MG/DL
GLUCOSE SERPL-MCNC: 99 MG/DL
HBA1C MFR BLD HPLC: 6.4 %
HCT VFR BLD CALC: 41.6 %
HDLC SERPL-MCNC: 63 MG/DL
HGB BLD-MCNC: 13.1 G/DL
IMM GRANULOCYTES NFR BLD AUTO: 0.2 %
LDLC SERPL CALC-MCNC: 79 MG/DL
LYMPHOCYTES # BLD AUTO: 1.85 K/UL
LYMPHOCYTES NFR BLD AUTO: 32.8 %
MAN DIFF?: NORMAL
MCHC RBC-ENTMCNC: 27.9 PG
MCHC RBC-ENTMCNC: 31.5 GM/DL
MCV RBC AUTO: 88.5 FL
MONOCYTES # BLD AUTO: 0.6 K/UL
MONOCYTES NFR BLD AUTO: 10.6 %
NEUTROPHILS # BLD AUTO: 2.89 K/UL
NEUTROPHILS NFR BLD AUTO: 51.3 %
NONHDLC SERPL-MCNC: 98 MG/DL
PLATELET # BLD AUTO: 244 K/UL
POTASSIUM SERPL-SCNC: 5.5 MMOL/L
PROT SERPL-MCNC: 6.6 G/DL
RBC # BLD: 4.7 M/UL
RBC # FLD: 13.9 %
SODIUM SERPL-SCNC: 142 MMOL/L
TRIGL SERPL-MCNC: 94 MG/DL
WBC # FLD AUTO: 5.64 K/UL

## 2021-04-05 PROCEDURE — 99072 ADDL SUPL MATRL&STAF TM PHE: CPT

## 2021-04-05 PROCEDURE — 93000 ELECTROCARDIOGRAM COMPLETE: CPT

## 2021-04-05 PROCEDURE — 99215 OFFICE O/P EST HI 40 MIN: CPT

## 2021-06-25 ENCOUNTER — RX RENEWAL (OUTPATIENT)
Age: 61
End: 2021-06-25

## 2021-09-27 ENCOUNTER — RX RENEWAL (OUTPATIENT)
Age: 61
End: 2021-09-27

## 2021-12-20 ENCOUNTER — RX RENEWAL (OUTPATIENT)
Age: 61
End: 2021-12-20

## 2022-02-21 ENCOUNTER — TRANSCRIPTION ENCOUNTER (OUTPATIENT)
Age: 62
End: 2022-02-21

## 2022-06-20 ENCOUNTER — RX RENEWAL (OUTPATIENT)
Age: 62
End: 2022-06-20

## 2022-08-10 RX ORDER — ROSUVASTATIN CALCIUM 40 MG/1
40 TABLET, FILM COATED ORAL
Qty: 90 | Refills: 0 | Status: DISCONTINUED | COMMUNITY
Start: 2018-03-26 | End: 2022-08-10

## 2022-08-31 ENCOUNTER — TRANSCRIPTION ENCOUNTER (OUTPATIENT)
Age: 62
End: 2022-08-31

## 2022-10-24 ENCOUNTER — RX RENEWAL (OUTPATIENT)
Age: 62
End: 2022-10-24

## 2022-12-07 ENCOUNTER — RX RENEWAL (OUTPATIENT)
Age: 62
End: 2022-12-07

## 2023-01-20 ENCOUNTER — RX RENEWAL (OUTPATIENT)
Age: 63
End: 2023-01-20

## 2023-02-06 ENCOUNTER — NON-APPOINTMENT (OUTPATIENT)
Age: 63
End: 2023-02-06

## 2023-02-06 ENCOUNTER — APPOINTMENT (OUTPATIENT)
Dept: CARDIOLOGY | Facility: CLINIC | Age: 63
End: 2023-02-06
Payer: MEDICARE

## 2023-02-06 VITALS
SYSTOLIC BLOOD PRESSURE: 175 MMHG | HEART RATE: 58 BPM | OXYGEN SATURATION: 100 % | DIASTOLIC BLOOD PRESSURE: 92 MMHG | WEIGHT: 174 LBS | HEIGHT: 61 IN | BODY MASS INDEX: 32.85 KG/M2

## 2023-02-06 DIAGNOSIS — I34.0 NONRHEUMATIC MITRAL (VALVE) INSUFFICIENCY: ICD-10-CM

## 2023-02-06 DIAGNOSIS — U07.1 COVID-19: ICD-10-CM

## 2023-02-06 PROCEDURE — 99215 OFFICE O/P EST HI 40 MIN: CPT

## 2023-02-06 PROCEDURE — 93000 ELECTROCARDIOGRAM COMPLETE: CPT

## 2023-02-07 DIAGNOSIS — R73.03 PREDIABETES.: ICD-10-CM

## 2023-02-07 LAB
ALBUMIN SERPL ELPH-MCNC: 4.5 G/DL
ALP BLD-CCNC: 82 U/L
ALT SERPL-CCNC: 16 U/L
ANION GAP SERPL CALC-SCNC: 13 MMOL/L
AST SERPL-CCNC: 20 U/L
BILIRUB SERPL-MCNC: 0.7 MG/DL
BUN SERPL-MCNC: 25 MG/DL
CALCIUM SERPL-MCNC: 10.4 MG/DL
CHLORIDE SERPL-SCNC: 106 MMOL/L
CHOLEST SERPL-MCNC: 306 MG/DL
CO2 SERPL-SCNC: 22 MMOL/L
CREAT SERPL-MCNC: 1.1 MG/DL
EGFR: 56 ML/MIN/1.73M2
ESTIMATED AVERAGE GLUCOSE: 134 MG/DL
GLUCOSE SERPL-MCNC: 105 MG/DL
HBA1C MFR BLD HPLC: 6.3 %
HDLC SERPL-MCNC: 69 MG/DL
LDLC SERPL CALC-MCNC: 202 MG/DL
NONHDLC SERPL-MCNC: 237 MG/DL
POTASSIUM SERPL-SCNC: 5.5 MMOL/L
PROT SERPL-MCNC: 7.2 G/DL
SODIUM SERPL-SCNC: 141 MMOL/L
TRIGL SERPL-MCNC: 174 MG/DL

## 2023-02-10 ENCOUNTER — RX RENEWAL (OUTPATIENT)
Age: 63
End: 2023-02-10

## 2023-03-06 ENCOUNTER — RX RENEWAL (OUTPATIENT)
Age: 63
End: 2023-03-06

## 2023-03-13 ENCOUNTER — APPOINTMENT (OUTPATIENT)
Dept: CARDIOLOGY | Facility: CLINIC | Age: 63
End: 2023-03-13

## 2023-03-14 ENCOUNTER — APPOINTMENT (OUTPATIENT)
Dept: CARDIOLOGY | Facility: CLINIC | Age: 63
End: 2023-03-14

## 2023-06-13 ENCOUNTER — TRANSCRIPTION ENCOUNTER (OUTPATIENT)
Age: 63
End: 2023-06-13

## 2023-08-08 ENCOUNTER — APPOINTMENT (OUTPATIENT)
Dept: CARDIOLOGY | Facility: CLINIC | Age: 63
End: 2023-08-08
Payer: MEDICARE

## 2023-08-08 VITALS
OXYGEN SATURATION: 99 % | SYSTOLIC BLOOD PRESSURE: 159 MMHG | HEIGHT: 61 IN | WEIGHT: 179 LBS | DIASTOLIC BLOOD PRESSURE: 86 MMHG | BODY MASS INDEX: 33.79 KG/M2 | HEART RATE: 61 BPM

## 2023-08-08 DIAGNOSIS — I25.10 ATHEROSCLEROTIC HEART DISEASE OF NATIVE CORONARY ARTERY W/OUT ANGINA PECTORIS: ICD-10-CM

## 2023-08-08 DIAGNOSIS — I10 ESSENTIAL (PRIMARY) HYPERTENSION: ICD-10-CM

## 2023-08-08 DIAGNOSIS — E78.5 HYPERLIPIDEMIA, UNSPECIFIED: ICD-10-CM

## 2023-08-08 LAB
ALBUMIN SERPL ELPH-MCNC: 4.5 G/DL
ALP BLD-CCNC: 72 U/L
ALT SERPL-CCNC: 28 U/L
ANION GAP SERPL CALC-SCNC: 11 MMOL/L
AST SERPL-CCNC: 31 U/L
BILIRUB SERPL-MCNC: 0.6 MG/DL
BUN SERPL-MCNC: 27 MG/DL
CALCIUM SERPL-MCNC: 9.9 MG/DL
CHLORIDE SERPL-SCNC: 107 MMOL/L
CHOLEST SERPL-MCNC: 177 MG/DL
CO2 SERPL-SCNC: 22 MMOL/L
CREAT SERPL-MCNC: 1.34 MG/DL
EGFR: 44 ML/MIN/1.73M2
ESTIMATED AVERAGE GLUCOSE: 128 MG/DL
GLUCOSE SERPL-MCNC: 98 MG/DL
HBA1C MFR BLD HPLC: 6.1 %
HCT VFR BLD CALC: 43.3 %
HDLC SERPL-MCNC: 73 MG/DL
HGB BLD-MCNC: 13.7 G/DL
LDLC SERPL CALC-MCNC: 85 MG/DL
MCHC RBC-ENTMCNC: 26.3 PG
MCHC RBC-ENTMCNC: 31.6 GM/DL
MCV RBC AUTO: 83.1 FL
NONHDLC SERPL-MCNC: 105 MG/DL
PLATELET # BLD AUTO: 242 K/UL
POTASSIUM SERPL-SCNC: 5.2 MMOL/L
PROT SERPL-MCNC: 7.2 G/DL
RBC # BLD: 5.21 M/UL
RBC # FLD: 16.1 %
SODIUM SERPL-SCNC: 140 MMOL/L
TRIGL SERPL-MCNC: 111 MG/DL
WBC # FLD AUTO: 6.06 K/UL

## 2023-08-08 PROCEDURE — 99215 OFFICE O/P EST HI 40 MIN: CPT

## 2023-08-08 PROCEDURE — 93000 ELECTROCARDIOGRAM COMPLETE: CPT

## 2023-08-08 NOTE — CARDIOLOGY SUMMARY
[de-identified] : 8/8/23 -sinus bradycardia at a rate of 59 bpm.  Leftward axis.  Nonspecific diminished voltage in leads V4-V6.

## 2023-08-08 NOTE — HISTORY OF PRESENT ILLNESS
[FreeTextEntry1] : Mrs. Pastora Brock presented to the office today for follow-up cardiac evaluation. I last evaluated the patient in the office on 2/6/2023.  The patient is a 63-year-old female with a history of coronary artery disease (status post ACS 10/12/17, PCI to RCA 10/12/17, PCI to Cx. 10/13/17), mild mitral regurgitation, mild tricuspid regurgitation, hypertension, a dyslipidemia, pre-diabetes, and COVID-19 viral infection (1/20/2022 and 11/20/2022, associated with mild viral syndromes, however, the patient has not recovered her sense of taste or smell since the infection of 1/2022).  The patient has been stable from a cardiac symptomatic standpoint since her previous visit here on 2/6/2023. Specifically, she has not experienced recurrence of the left neck sensation or the bilateral upper extremity numbness sensation that she experienced in association with her acute coronary syndrome on 10/12/17. She has not experienced any symptoms of chest discomfort or dyspnea on exertion. She has not noted orthopnea, paroxysmal nocturnal dyspnea, or lower extremity edema. She has not experienced any episodes of palpitations, presyncope or syncope.  Laboratory studies performed on 2/6/2023 (on Crestor 40 mg every other day) revealed cholesterol 306, triglycerides 174, HDL 69, and calculated .  The liver chemistries were normal.  The BUN and creatinine were 25 and 1.1, respectively.  The potassium level was 5.5.  The glucose level was 105 and the hemoglobin A1c level was 6.3%.  I discussed these findings on the telephone with the patient on 2/7/2023, at which time I instructed her to try to increase the dosage of Crestor back up to 40 mg daily (noting that she had experienced lower extremity cramping previously on this dosage).  She has since been tolerating the 40 mg daily dosage of the Crestor, having experienced lower extremity cramping infrequently.  Echocardiography most recently performed on 7/14/2020 revealed normal cardiac chamber sizes with normal left ventricular wall thickness and wall motion.  Left ventricular systolic function was normal, with an estimated ejection fraction of 65%.  Minimal mitral regurgitation and mild tricuspid regurgitation were demonstrated, without evidence of pulmonary hypertension.  Nuclear stress testing performed on 8/3/2020 revealed the patient to exhibit fair exercise tolerance for her age, without the inducement of cardiac symptoms, electrocardiographic evidence of myocardial ischemia, or significant arrhythmias.  The cardiac imaging portion of the study revealed normal left ventricular myocardial perfusion and systolic function.  Previous History:  The patient did not have a documented cardiac history until 10/12/17. She awakened from sleep early that morning not feeling well, having been experiencing a vaguely-described sensation in the left neck region and bilateral upper extremity "numbness". She was evaluated in the emergency room at [facility] shortly after the onset of the symptoms, and was noted to be exhibiting electrocardiographic evidence of an acute inferior wall myocardial infarction. She was brought urgently to the cardiac catheterization laboratory. Left ventriculography revealed posterobasilar hypokinesis with an estimated ejection fraction of 55%. The left main coronary artery was noted to have minor luminal irregularities, without any obstructive lesions.  A 20% stenosis is noted involving the proximal portion of the left anterior descending artery, with minor luminal irregularities distally.  A 99% stenosis was noted involving the proximal portion of the circumflex artery as well as a 99% stenosis involving the mid-portion of this vessel, a 20% stenosis involving the first obtuse marginal branch of the vessel, and a 70% stenosis involving a small posterolateral branch. The right coronary artery was occluded in the proximal portion, with the lesion being irregularly contoured, hazy, and associated with a moderate filling defect. This lesion was felt to be the culprit lesion for the acute myocardial infarction, and a successful intervention with a drug-eluting stent was performed at the time of the angiogram. The patient was brought back to the cardiac catheterization laboratory on 10/13/17, at which time interventions with drug-eluting stents were performed on the two 99% lesions involving the proximal and mid-portions of the circumflex artery. The patient had an uncomplicated hospital course following these procedures. She was treated with a beta-blocker, an ACE inhibitor, a statin, aspirin, and Brilinta.  As far as risk factors for coronary artery disease are concerned, the patient has a history of hypertension, a dyslipidemia, and pre-diabetes. She discontinued cigarette smoking approximately 37 years ago, having smoked an average of 3-4 cigarettes per day for a period of 5 years prior to that time. She describes having a family history of premature coronary artery disease in her brother, stating that he underwent a coronary stenting procedure while in his 50's.  Past medical/surgical history according to the patient is otherwise significant for arthroscopic left shoulder rotator cuff repair on 11/16/10, arthroscopic right shoulder rotator cuff repair (secondary to an accident) in 2012, surgery for treatment of a left foot tailor's bunion (including a screw in the fifth toe), surgery for treatment of a tailor's bunion involving the right foot, and a laparoscopic appendectomy for treatment of acute appendicitis. She has had 5 pregnancies, 2 of which spontaneously terminated, and the other 3 of which were full-term and uncomplicated, delivered vaginally.

## 2023-08-08 NOTE — PHYSICAL EXAM
[General Appearance - In No Acute Distress] : no acute distress [Normal Conjunctiva] : the conjunctiva exhibited no abnormalities [No Oral Pallor] : no oral pallor [Respiration, Rhythm And Depth] : normal respiratory rhythm and effort [Auscultation Breath Sounds / Voice Sounds] : lungs were clear to auscultation bilaterally [Bowel Sounds] : normal bowel sounds [Abdomen Tenderness] : non-tender [Abnormal Walk] : normal gait [Cyanosis, Localized] : no localized cyanosis [] : no rash [Oriented To Time, Place, And Person] : oriented to person, place, and time [Not Palpable] : not palpable [No Precordial Heave] : no precordial heave was noted [Normal Rate] : normal [Rhythm Regular] : regular [Normal S1] : normal S1 [Normal S2] : normal S2 [No Gallop] : no gallop heard [No Murmur] : no murmurs heard [2+] : left 2+ [No Abnormalities] : the abdominal aorta was not enlarged and no bruit was heard [No Pitting Edema] : no pitting edema present [FreeTextEntry1] : no JVD is appreciated at a 45 angle [Apical Thrill] : no thrill palpable at the apex [Click] : no click [Pericardial Rub] : no pericardial rub [Right Carotid Bruit] : no bruit heard over the right carotid [Left Carotid Bruit] : no bruit heard over the left carotid

## 2023-08-08 NOTE — DISCUSSION/SUMMARY
[FreeTextEntry1] : Mrs. Brock is status post an acute inferior wall myocardial infarction on 10/12/17, treated promptly with a successful primary PCI (MIRNA) involving an occlusion with thrombus involving the proximal portion of the right coronary artery. The following day, she underwent successful PCI (MIRNA) of two tight stenoses involving the proximal and mid-portions of the circumflex artery. Left ventriculography at the time of the initial procedure on 1/12/17 revealed posterobasilar hypokinesis with overall preserved left ventricular systolic function (estimated ejection fraction of 55%).  A follow-up echocardiogram performed in our office on 6/5/18 revealed normal left ventricular wall motion and systolic function, with an estimated ejection fraction of 65%.   The patient has been doing well from a cardiac symptomatic standpoint since her previous visit here on 2/6/2023 (and since undergoing the coronary stenting procedures). Specifically, she does not describe having experienced any signs or symptoms to suggest the presence of an anginal syndrome, congestive heart failure, or a hemodynamically-compromising arrhythmia. Her cardiac examination today is unremarkable. Her blood pressure reading is moderately elevated today. Her electrocardiogram  today reveals sinus bradycardia at a rate of 59 beats per minute with non-specific diminished voltage in leads V4-V6, essentially unchanged from her previous office tracing, allowing for lead placement variation.  I have instructed the patient to continue Benicar at a dosage of 20 mg daily for the time being.  Although her blood pressure reading in the office today is moderately elevated, she states that her home readings have been consistently running in the normal range.  For further evaluation, I am referring her for a 24-hour ambulatory blood pressure monitoring study to better assess her blood pressure control on her present antihypertensive medication regimen.  She is going to make arrangements to have this study performed through our office, and I will telephone her to discuss the findings, once the study has been completed.  I am referring the patient for a follow-up fasting lipid profile (on Crestor 40 mg daily), chemistry screen, and hemoglobin A1c level this morning.  I have reviewed the findings of the echocardiogram of 7/14/2020 and the nuclear stress study of 8/3/2020 in detail with the patient today.  I am referring the patient for follow-up nuclear stress testing at this point to more objectively assess the status of her ischemic heart disease.  She is going to make arrangements to have this study performed through our office, and I will telephone her to discuss the findings, once the study has been completed.  I have again discussed the implications of the acute inferior wall myocardial infarction and the findings of the coronary angiogram in detail with the patient and her  today.  The importance of dietary modification, weight loss, and regular exercise as tolerated was again emphasized to the patient today.  I have asked the patient to call me if she should have any questions or problems pertaining to these matters, and especially if she should experience any concerning symptoms. I have otherwise asked her to return to the office for follow-up cardiac evaluation and blood pressure reassessment in 6 months, provided she remains clinically stable in the interim, and the findings on the upcoming cardiovascular studies do not warrant sooner evaluation and/or treatment. [EKG obtained to assist in diagnosis and management of assessed problem(s)] : EKG obtained to assist in diagnosis and management of assessed problem(s)

## 2023-10-04 NOTE — PHYSICAL EXAM
Quality 226: Preventive Care And Screening: Tobacco Use: Screening And Cessation Intervention: Patient screened for tobacco use and is an ex/non-smoker Detail Level: Simple Quality 431: Preventive Care And Screening: Unhealthy Alcohol Use - Screening: Patient not identified as an unhealthy alcohol user when screened for unhealthy alcohol use using a systematic screening method [General Appearance - In No Acute Distress] : no acute distress [No Oral Pallor] : no oral pallor [Normal Conjunctiva] : the conjunctiva exhibited no abnormalities [Respiration, Rhythm And Depth] : normal respiratory rhythm and effort [Auscultation Breath Sounds / Voice Sounds] : lungs were clear to auscultation bilaterally [Bowel Sounds] : normal bowel sounds [Abdomen Tenderness] : non-tender [Cyanosis, Localized] : no localized cyanosis [Abnormal Walk] : normal gait [] : no rash [Not Palpable] : not palpable [Oriented To Time, Place, And Person] : oriented to person, place, and time [No Precordial Heave] : no precordial heave was noted [Normal Rate] : normal [Rhythm Regular] : regular [Normal S1] : normal S1 [Normal S2] : normal S2 [No Gallop] : no gallop heard [No Murmur] : no murmurs heard [2+] : left 2+ [No Abnormalities] : the abdominal aorta was not enlarged and no bruit was heard [No Pitting Edema] : no pitting edema present [FreeTextEntry1] : no JVD is appreciated at a 45° angle [Apical Thrill] : no thrill palpable at the apex [Click] : no click [Pericardial Rub] : no pericardial rub [Right Carotid Bruit] : no bruit heard over the right carotid [Left Carotid Bruit] : no bruit heard over the left carotid

## 2024-01-19 ENCOUNTER — TRANSCRIPTION ENCOUNTER (OUTPATIENT)
Age: 64
End: 2024-01-19

## 2024-02-07 ENCOUNTER — RX RENEWAL (OUTPATIENT)
Age: 64
End: 2024-02-07

## 2024-02-07 RX ORDER — METOPROLOL SUCCINATE 25 MG/1
25 TABLET, EXTENDED RELEASE ORAL TWICE DAILY
Qty: 180 | Refills: 3 | Status: ACTIVE | COMMUNITY
Start: 2018-07-02 | End: 1900-01-01

## 2024-02-07 RX ORDER — OLMESARTAN MEDOXOMIL 20 MG/1
20 TABLET, FILM COATED ORAL DAILY
Qty: 90 | Refills: 3 | Status: ACTIVE | COMMUNITY
Start: 2019-10-07 | End: 1900-01-01

## 2024-04-02 ENCOUNTER — RX RENEWAL (OUTPATIENT)
Age: 64
End: 2024-04-02

## 2024-04-02 RX ORDER — ROSUVASTATIN CALCIUM 40 MG/1
40 TABLET, FILM COATED ORAL
Qty: 90 | Refills: 3 | Status: ACTIVE | COMMUNITY
Start: 1900-01-01 | End: 1900-01-01

## 2024-05-28 ENCOUNTER — APPOINTMENT (OUTPATIENT)
Dept: CARDIOLOGY | Facility: CLINIC | Age: 64
End: 2024-05-28

## 2024-07-14 ENCOUNTER — NON-APPOINTMENT (OUTPATIENT)
Age: 64
End: 2024-07-14

## 2024-07-15 ENCOUNTER — RESULT REVIEW (OUTPATIENT)
Age: 64
End: 2024-07-15

## 2024-07-25 ENCOUNTER — APPOINTMENT (OUTPATIENT)
Dept: ORTHOPEDIC SURGERY | Facility: CLINIC | Age: 64
End: 2024-07-25

## 2025-02-07 ENCOUNTER — RX RENEWAL (OUTPATIENT)
Age: 65
End: 2025-02-07

## 2025-02-26 ENCOUNTER — APPOINTMENT (OUTPATIENT)
Dept: CARDIOLOGY | Facility: CLINIC | Age: 65
End: 2025-02-26
Payer: MEDICARE

## 2025-02-26 VITALS
BODY MASS INDEX: 34.17 KG/M2 | HEART RATE: 80 BPM | WEIGHT: 181 LBS | DIASTOLIC BLOOD PRESSURE: 81 MMHG | HEIGHT: 61 IN | OXYGEN SATURATION: 97 % | SYSTOLIC BLOOD PRESSURE: 145 MMHG

## 2025-02-26 DIAGNOSIS — I25.10 ATHEROSCLEROTIC HEART DISEASE OF NATIVE CORONARY ARTERY W/OUT ANGINA PECTORIS: ICD-10-CM

## 2025-02-26 DIAGNOSIS — E78.5 HYPERLIPIDEMIA, UNSPECIFIED: ICD-10-CM

## 2025-02-26 DIAGNOSIS — I34.0 NONRHEUMATIC MITRAL (VALVE) INSUFFICIENCY: ICD-10-CM

## 2025-02-26 DIAGNOSIS — I10 ESSENTIAL (PRIMARY) HYPERTENSION: ICD-10-CM

## 2025-02-26 LAB
ALBUMIN SERPL ELPH-MCNC: 4.4 G/DL
ALP BLD-CCNC: 87 U/L
ALT SERPL-CCNC: 19 U/L
ANION GAP SERPL CALC-SCNC: 14 MMOL/L
AST SERPL-CCNC: 24 U/L
BILIRUB SERPL-MCNC: 0.8 MG/DL
BUN SERPL-MCNC: 29 MG/DL
CALCIUM SERPL-MCNC: 9.7 MG/DL
CHLORIDE SERPL-SCNC: 107 MMOL/L
CHOLEST SERPL-MCNC: 182 MG/DL
CO2 SERPL-SCNC: 22 MMOL/L
CREAT SERPL-MCNC: 1.11 MG/DL
EGFR: 54 ML/MIN/1.73M2
ESTIMATED AVERAGE GLUCOSE: 131 MG/DL
GLUCOSE SERPL-MCNC: 104 MG/DL
HBA1C MFR BLD HPLC: 6.2 %
HCT VFR BLD CALC: 40.9 %
HDLC SERPL-MCNC: 70 MG/DL
HGB BLD-MCNC: 12.6 G/DL
LDLC SERPL CALC-MCNC: 93 MG/DL
MCHC RBC-ENTMCNC: 24.3 PG
MCHC RBC-ENTMCNC: 30.8 G/DL
MCV RBC AUTO: 78.8 FL
NONHDLC SERPL-MCNC: 113 MG/DL
PLATELET # BLD AUTO: 290 K/UL
POTASSIUM SERPL-SCNC: 4.5 MMOL/L
PROT SERPL-MCNC: 6.8 G/DL
RBC # BLD: 5.19 M/UL
RBC # FLD: 17.6 %
SODIUM SERPL-SCNC: 144 MMOL/L
TRIGL SERPL-MCNC: 109 MG/DL
TSH SERPL-ACNC: 1.91 UIU/ML
WBC # FLD AUTO: 5.42 K/UL

## 2025-02-26 PROCEDURE — 99215 OFFICE O/P EST HI 40 MIN: CPT

## 2025-02-26 PROCEDURE — G2211 COMPLEX E/M VISIT ADD ON: CPT

## 2025-02-26 PROCEDURE — 93000 ELECTROCARDIOGRAM COMPLETE: CPT

## 2025-02-26 RX ORDER — EZETIMIBE 10 MG/1
10 TABLET ORAL
Qty: 90 | Refills: 3 | Status: ACTIVE | COMMUNITY
Start: 2025-02-26 | End: 1900-01-01

## 2025-05-09 ENCOUNTER — RX RENEWAL (OUTPATIENT)
Age: 65
End: 2025-05-09